# Patient Record
Sex: FEMALE | URBAN - METROPOLITAN AREA
[De-identification: names, ages, dates, MRNs, and addresses within clinical notes are randomized per-mention and may not be internally consistent; named-entity substitution may affect disease eponyms.]

---

## 2022-06-03 ENCOUNTER — OFFICE VISIT (OUTPATIENT)
Dept: URBAN - METROPOLITAN AREA CLINIC 23 | Facility: CLINIC | Age: 77
End: 2022-06-03
Payer: COMMERCIAL

## 2022-06-03 DIAGNOSIS — H25.813 COMBINED FORMS OF AGE-RELATED CATARACT, BILATERAL: ICD-10-CM

## 2022-06-03 DIAGNOSIS — E11.9 TYPE 2 DIABETES MELLITUS W/O COMPLICATION: Primary | ICD-10-CM

## 2022-06-03 PROCEDURE — 99203 OFFICE O/P NEW LOW 30 MIN: CPT | Performed by: OPTOMETRIST

## 2022-06-03 ASSESSMENT — INTRAOCULAR PRESSURE
OD: 17
OS: 17

## 2022-06-03 ASSESSMENT — KERATOMETRY: OD: 44.63

## 2022-06-03 NOTE — IMPRESSION/PLAN
Impression: Type 2 diabetes mellitus w/o complication: D33.2. Bilateral. Condition: stable. Plan: Diabetes type II: no background retinopathy, no signs of neovascularization noted. OPTOS photos ordered and reviewed. Discussed ocular and systemic benefits of blood sugar control.

## 2022-06-03 NOTE — IMPRESSION/PLAN
Impression: Combined forms of age-related cataract, bilateral: H25.813. Bilateral. Condition: moderate. Plan: Discussed findings. Cataracts moderate but patient not symptomatic, prefers no treatment at this time. Advised patient to call with any vision changes, monitor with yearly DFEs.

## 2023-06-23 ENCOUNTER — OFFICE VISIT (OUTPATIENT)
Dept: URBAN - METROPOLITAN AREA CLINIC 23 | Facility: CLINIC | Age: 78
End: 2023-06-23
Payer: COMMERCIAL

## 2023-06-23 DIAGNOSIS — E11.9 TYPE 2 DIABETES MELLITUS WITHOUT COMPLICATIONS: Primary | ICD-10-CM

## 2023-06-23 DIAGNOSIS — H25.813 COMBINED FORMS OF AGE-RELATED CATARACT, BILATERAL: ICD-10-CM

## 2023-06-23 PROCEDURE — 99213 OFFICE O/P EST LOW 20 MIN: CPT | Performed by: OPTOMETRIST

## 2023-06-23 ASSESSMENT — KERATOMETRY
OD: 43.88
OS: 43.50

## 2023-06-23 ASSESSMENT — INTRAOCULAR PRESSURE
OD: 19
OS: 19

## 2023-06-23 NOTE — IMPRESSION/PLAN
Impression: Type 2 diabetes mellitus without complications: Q03.0. Bilateral. Condition: established, stable. Plan: Discussed findings. OPTOS photos ordered and reviewed with patient today. No signs of retinopathy or neovascularization noted today. Continue blood sugar control and f/u with PCP, monitor yearly.

## 2023-06-23 NOTE — IMPRESSION/PLAN
Impression: Combined forms of age-related cataract, bilateral: H25.813 Bilateral. Condition: moderate. Plan: Discussed findings. Cataracts moderate and patient somewhat symptomatic but does not want treatment at this time. Advised patient that changing glasses cannot improve vision and that cataract surgery is the only way to improve vision. Patient will think about surgery and call back to schedule consult if she changes her mind.

## 2023-08-14 ENCOUNTER — OFFICE VISIT (OUTPATIENT)
Dept: URBAN - METROPOLITAN AREA CLINIC 23 | Facility: CLINIC | Age: 78
End: 2023-08-14
Payer: COMMERCIAL

## 2023-08-14 DIAGNOSIS — H25.813 COMBINED FORMS OF AGE-RELATED CATARACT, BILATERAL: Primary | ICD-10-CM

## 2023-08-14 DIAGNOSIS — E11.9 TYPE 2 DIABETES MELLITUS W/O COMPLICATION: ICD-10-CM

## 2023-08-14 PROCEDURE — 99204 OFFICE O/P NEW MOD 45 MIN: CPT | Performed by: OPHTHALMOLOGY

## 2023-08-14 ASSESSMENT — VISUAL ACUITY
OD: 20/30
OS: 20/20

## 2023-08-14 ASSESSMENT — KERATOMETRY
OD: 44.00
OS: 43.38

## 2023-08-14 ASSESSMENT — INTRAOCULAR PRESSURE
OD: 18
OS: 18